# Patient Record
Sex: MALE | Race: WHITE | URBAN - METROPOLITAN AREA
[De-identification: names, ages, dates, MRNs, and addresses within clinical notes are randomized per-mention and may not be internally consistent; named-entity substitution may affect disease eponyms.]

---

## 2018-09-10 NOTE — PROGRESS NOTES
"    Form MCSA-5875 (revised 2015)                                       B No. [id number]  Expiration Date: 2018    MEDICAL EXAMINATION REPORT FORM  (FOR  MEDICAL CERTIFICATION)    SECTION 1.  Information (to be filled out by )    PERSONAL INFORMATION    Last Name: Lawrence  First Name: Romario Jenkins Initial: LAVINIA  : 1975      Age: 43        Street Address: [address] [place] [address]   City: [place]   State/Province: [place]   Zip Code: [address]  's License Number: [id number]      Issuing State/Province: [place]       Phone: [phone]     Gender: male  E-mail (optional): [email or web address]  CLP/CDL Applicant Peña*: no   ID Verified by:  's license  Has your [employer] medical certificate ever been denied or issued for less than 2 years? NO     (*CLP/CDL Applicant/Peña: See instructions for definitions)  (** ID verified By:Record what type of photo ID was used to verify the identity of the , e.g., CDL,'s license, passport)       HEALTH HISTORY    Have you ever had surgery? If \"yes\", please list and explain below.  [  ] Yes [ x ]  No  [  ] Not Sure    Hx of Seizures, last   Hx of Asthma - albuterol prn daily     Are you currently taking medications (prescription, over-the-counter, herbal remedies, diet supplements)? If \"yes,\" please describe below.   [ x ] Yes [  ]  No  [  ] Not Sure    As above      HEALTH HISTORY (continued)          Do you have or have you ever had:                                                     Not  Yes    No     Sure                                                                          Not    Yes     No   Sure    1. Head/brain injuries or illness (e.g., concussion)  x     16. Dizziness, headaches, numbness, tingling, or memory loss  x       2. Seizures, epilepsy x     Last , off meds for 8 yrs 17. Unexplained weight loss  x       3. Eye problems (except glasses or contacts)  x     18. " "Stroke, mini stroke (TIA), paralysis, or weakness  x       4. Ear and/or hearing problems  x     19. Missing or limited use of arm, hand, finger, leg, foot, toe  x       5. Heart disease, heart attack, bypass, or other heart problems  x     20. Neck or back problems  x       6. Pacemaker, stents, implantable devices, or other heart procedures  x     21. Bone, muscle, joint or nerve problems  x       7. High blood preassure  x     22. Blood clots or bleeding problems  x       8. High cholesterol  x     23. Cancer  x       9. Chronic (long term) cough, shortness of breath, or other breathing problems  x     24. Chronic (long term) infection or other chronic disease  x       10. Lung disease (e.g., asthma)  x     25. Sleep disorders, pauses in breathing while asleep, daytime sleepiness, loud snoring  x       11. Kidney problems, kidney stones, or pain/problems with urination  x     26. Have you ever had a sleep test? (e.g., sleep apnea)  x       12. Stomach, liver, or digestive problems  x     27. Have you ever spent the night in the hospital?  x       13. Diabetes or blood sugar problems  x     28. Have you ever had a broken bone?  x             Insulin used  x     29. Have you ever used or do you now use tobacco?  x       14. Anxiety, depression, nervousness, other mental health problems  x     30. Do you currently drink alcohol?   x       15. Fainting or passing out  x     31.  Have you used an illegal substance within the past two years?   x         32. Have you ever failed a drug test or been dependent on an illegal substance?   x         Other health condition(s) not described above: [  ] Yes [ x ]  No  [  ] Not Sure    As above     Did you answer \"yes\" to any of questions 1-32?  If so, please comment further on those health conditions below: [  ] Yes [ x ]  No  [  ] Not Sure      See above            'S SIGNATURE  I certify that the above information is accurate and complete. I understand that " "inaccurate, false or missing information may invalidate the examination and my Medical Examiner's Certificate, that submission of fraudulent or intentionally false information is a violation of 49CFR 390.35, and that submission of fraudulent or intentionally false information may subject me to civil or ciminal penalties under 49 .37 and 49  Appendices A and B.     's signature:____________________________        Date: 9/12/2018                                         Signature if printed       Section 2. Examination Report (to be filled out by the medical examiner)      HEALTH HISTORY REVIEW  Review and discuss pertinent  answers and any available medical records. Comment on the 's responses to the \"health history\" questions that may affect the 's safe operation of a commercial motor vehicle (CMV).       Reviewed, see above            TESTING     Pulse rate: 95     Pulse rhythm regular: YES  Height: 5 feet 10 inches  Weight: 157 pounds    Blood Pressure  Blood Pressure: 130 Systolic  82 Diastolic  Sitting yes  Second Reading (optional) NA  Other Testing if indicated          Urinalysis  Urinalysis is required. Numerical readings must be recorded.  Urine Specimen Specific Gravity Protein Blood Sugar    1.015 NEGATIVE NEGATIVE NEGATIVE   Protein, blood or sugar in the urine may be an indication for further testing to rule out any underlying medical problem.    Vision  Standard is at least 20/40 acuity (Snellen) in each eye with or without correction. At least 70  field of vision in horizontal meridian measured in each eye. The use of corrective lenses should be noted on the Medical Examiner's Certificate.    ACUITY UNCORRECTED CORRECTED HORIZONTAL FIELD OF VISION   Right Eye 20/50 N/A Greater than 70 degrees   Left Eye 20/40 N/A Greater than 70 degrees   Both Eyes 20/40 N/A      Seen by Dr Hernandez at [facility] on 9/17/18, vision noted to be 20/25, with health " eyes    Applicant can recognize and distinguish among traffic control signals and devices showing red, green and sonny colors? YES    Monocular vision: No    Referred to ophthalmologist or optometrist?  No    Received documentation from ophthalmologist or optometrist?  No    HEARING   Standard: Must first perceive forced whispered voice at not less than 5 feet OR average hearing loss of less than or equal to 40 dB, in better ear (with or without hearing aid).    Check if hearing aid used for test:  Neither    Whispered voice test:    Record the distance from the individual at which a forced whispered voice can first be heard:        Right Ear: greater than 5 feet                  Left Ear: greater than 5 feet             PHYSICAL EXAMINATION  The presence of a certain condition may not necessarily disqualify a , particularly if the condition is controlled adequately, is not likely to worsen, or is readily amenable to treatment. Even if a condition does not disqualify a , the Medical Examiner may consider deferring the  temporarily. Also, the  should be advised to take the necessary steps to correct the condition as soon as possible, particularly if neglecting the condition, could result in more serious illness that might affect driving.    Check the body systems for abnormalities.  BODY SYSTEM Normal or Abnormal   1. General  Normal   2. Skin Normal   3. Eyes Normal   4. Ears Normal   5. Mouth/throat Normal   6. Cardiovascular Normal   7. Lungs/chest Normal   8. Abdomen Normal   9. Genito-urinary System including hernias Normal   10. Back/Spine Normal   11. Extremities/joints Normal   12. Neurological system including reflexes Normal   13. Gait Normal   14. Vascular system Normal     Discuss any abnormal answers in detail in the space below and indicate whether it would affect the 's ability to operate a CMV. Enter applicable item number before each comment.    none            Please  complete only one of the following (Federal or State) Medical Examiner Determination sections:    MEDICAL EXAMINER DETERMINATION (Federal)  Use this section for examinations performed in accordance with the Federal Motor Carrier Safety Regulations (49 ..49):    Meets standards, but periodic monitoring required:     1 year, history of seizures, ophthalmology waiver                   If the  meets the standards outlined in 49 .41, then complete a Medical Examiner's Certificate as stated in 49 .43(h), as appropriate.     I have performed this evaluation for certification. I have personally reviewed all available records and recorded information pertaining to this evaluation, and attest that to the best of my knowledge, I believe it to be true and correct.    Medical Examiner's Signature: _________________________________________                                                                                   (if printed)    Medical Examiner's Name: Sadiq Huerta MD  Medical Examiner's Address:   [address] [address] [phone]  Dept: [phone]    Date Certificate Signed: 9/12/2018    Medical Examiner's State License, Certificate, or Registration Number: [id number]    Issuing State:  [place]    National Registry Number:  [id number]                Medical Examiner's Certificate Expiration Date: 9/12/2019                          Date submitted to registry: 9/18/18  Submitted by: Sara Daniels Medical Assistant

## 2018-09-12 ENCOUNTER — OFFICE VISIT (OUTPATIENT)
Dept: FAMILY MEDICINE | Facility: CLINIC | Age: 43
End: 2018-09-12

## 2018-09-12 DIAGNOSIS — Z02.89 HEALTH EXAMINATION OF DEFINED SUBPOPULATION: Primary | ICD-10-CM

## 2018-09-12 PROCEDURE — 81003 URINALYSIS AUTO W/O SCOPE: CPT | Performed by: FAMILY MEDICINE

## 2018-09-12 PROCEDURE — 99499 UNLISTED E&M SERVICE: CPT | Performed by: FAMILY MEDICINE

## 2018-09-12 NOTE — MR AVS SNAPSHOT
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      Who to contact     If you have questions or need follow up information about today's clinic visit or your schedule please contact [facility] directly at [phone].  Normal or non-critical lab and imaging results will be communicated to you by MyChart, letter or phone within 4 business days after the clinic has received the results. If you do not hear from us within 7 days, please contact the clinic through MyChart or phone. If you have a critical or abnormal lab result, we will notify you by phone as soon as possible.  Submit refill requests through [employer] or call your pharmacy and they will forward the refill request to us. Please allow 3 business days for your refill to be completed.          Additional Information About Your Visit        MyChart Information     [employer] lets you send messages to your doctor, view your test results, renew your prescriptions, schedule appointments and more. To sign up, go to [email or web address] . Click on \"Log in\" on the left side of the screen, which will take you to the Welcome page. Then click on \"Sign up Now\" on the right side of the page.     You will be asked to enter the access code listed below, as well as some personal information. Please follow the directions to create your username and password.     Your access code is: [email or web address]  Expires: 2018  4:24 PM     Your access code will  in 90 days. If you " need help or a new code, please call your [place] clinic or [phone].        Care EveryWhere ID     This is your Care EveryWhere ID. This could be used by other organizations to access your [place] medical records  [id number]         Blood Pressure from Last 3 Encounters:   No data found for BP    Weight from Last 3 Encounters:   No data found for Wt              We Performed the Following     [place] U/A W/O MICRO        Primary Care Provider    None Specified       No primary provider on file.        Equal Access to Services     [facility]: Hadii aad ku hadasho Soomaali, waaxda luqadaha, qaybta kaalmada adeegyada, waxay balbirin hayroulan adeeg brice mendez . So [facility] [phone].    ATENCIÓN: Si habla español, tiene a low disposición servicios gratuitos de asistencia lingüística. Llame al [phone].    We comply with applicable federal civil rights laws and [place] laws. We do not discriminate on the basis of race, color, national origin, age, disability, sex, sexual orientation, or gender identity.            Thank you!     Thank you for choosing [facility] [place]  for your care. Our goal is always to provide you with excellent care. Hearing back from our patients is one way we can continue to improve our services. Please take a few minutes to complete the written survey that you may receive in the mail after your visit with us. Thank you!             Your Updated Medication List - Protect others around you: Learn how to safely use, store and throw away your medicines at www.disposemymeds.org.      Notice  As of 9/12/2018 11:59 PM    You have not been prescribed any medications.

## 2018-09-17 LAB
BILIRUB UR QL: NORMAL
CLARITY: CLEAR
COLOR UR: YELLOW
GLUCOSE URINE: NORMAL MG/DL
HGB UR QL: NORMAL
KETONES UR QL: NORMAL MG/DL
NITRITE UR QL STRIP: NORMAL
PH UR STRIP: 7.5 PH (ref 5–7)
PROT UR QL: NORMAL MG/DL
SP GR UR STRIP: 1.01 (ref 1–1.03)
SPECIMEN VOL UR: NORMAL ML
UROBILINOGEN UR QL STRIP: 0.2 EU/DL (ref 0.2–1)
WBC #/AREA URNS HPF: NORMAL /[HPF]